# Patient Record
(demographics unavailable — no encounter records)

---

## 2017-05-20 NOTE — HP
DATE OF ADMISSION:  2017

 

Patient of Dr. TAVARES Mitchell.

 

HISTORY OF PRESENT ILLNESS:  The patient is a very pleasant 62-year-old white female with a past his
tory of multiple medical problems including hypertension, type 2 diabetes, chronic atrial fibrillati
on on anticoagulation with Xarelto, chronic sarcoidosis on home oxygen with pulmonary hypertension a
nd sleep apnea on a CPAP, being followed by Dr. Vallecillo and recurrent episodes of falls with 3 f
alls in the last 10 years with subsequent compression fractures initially of L2 and L5 and now with 
a fall with an acute compression fracture of upper part of L1.  She was seen by and Neurosurgery, fo
und to be neurologically intact and was admitted; however, for severe pain.  The patient states that
 she required morphine for pain control with discharge summary that states she is only on tramadol. 
 She has no previous history of osteoporosis as far she knows, but is on treatment for significantly
 low vitamin D with vitamin D 50,000 units weekly and does have the above-mentioned history of sarco
idosis, but no history of steroid treatment as far she knows.  She has no numbness or tingling in ar
ms and legs and only has pain in her back upon movement where she has been wearing a [employer] Clamshell 
brace and is able to sit up with this is in place.  She has been undergoing therapy with pain medica
tion required.

 

REVIEW OF SYSTEMS:  HEENT:  Negative for any change in vision or hearing, hoarseness or dysphagia.  
She has had no trauma to her head.  No history of bleeding in her head.  Pulmonary:  She has the abo
ve-mentioned history of sarcoidosis, sleep apnea, pulmonary hypertension with chronic home oxygen an
d CPAP, also on treatment with Advair and handheld nebulizers with fair control.  She is usually abl
e to ambulate in her home.  In fact, fell while she was shopping.  Cardiovascular:  She denies any c
hest pain, palpitations, history of myocardial infarction or CVA.  She does have the chronic atrial 
fibrillation on Xarelto with above-mentioned, no bleeding and no history of CVA or congestive heart 
failure.  Gastrointestinal:  She denies nausea, vomiting, diarrhea, constipation, or abdominal pain.
  Genitourinary:  She denies dysuria, hematuria, or nocturia.  Musculoskeletal:  She has the above-m
entioned back pain.  _____ negative.  Neurologic:  She denies localized numbness, weakness in arms o
r extremities.

 

PAST MEDICAL HISTORY:  Only remarkable for the above medical history.

 

PAST SURGICAL HISTORY:  Positive for , hysterectomy, cholecystectomy.

 

FAMILY HISTORY:  Positive for lung cancer.

 

SOCIAL HISTORY:  She is a nonsmoker, does drink rarely.

 

ALLERGIES:  She is allergic to SULFA and NAPROSYN.  She is unable to tolerate CODEINE, but can ramonita
ate other opioids.

 

PHYSICAL EXAMINATION:

GENERAL:  Shows a middle-aged age, obese white female lying in bed in no acute distress except when 
she moves, whose is oriented x3 and cooperative.

VITAL SIGNS:  Showed to have blood pressure 149/66, O2 sats 94% on 2 liters, respirations 20, pulse 
103, afebrile.

HEENT:  Pupils are equal, round, and reactive to light and accommodation.  Sclerae are anicteric.  C
onjunctivae pale.  Oral mucous membranes well hydrated.

NECK:  Supple, no nodes or masses.  JVP is not elevated.

LUNGS:  Clear, only a few crackles, decreased breath sounds in the bases.

CARDIAC:  Shows an irregular irregularly rhythm.  No gallops or murmurs.

ABDOMEN:  Soft, nontender with no masses or organomegaly.

SKIN/EXTREMITIES:  Show severe tenderness to palpation in the upper back.  No bruising or bleeding.

NEUROLOGICAL:  Cranial nerves are intact.  Deep tendon reflexes 2+ and equal.  Absent Babinski.

 

LABORATORY AND DIAGNOSTIC DATA:  Most recently shows sodium 135, potassium 4.0, chloride 97, bicarbo
radha 30, BUN 9, creatinine 0.8, glucose 112, hemoglobin 10, bilirubin 1.1, alkaline phosphatase 177,
 AST normal, troponin normal.  PO2 is 74, pCO2 of 54 on 32%.  Chest x-ray showed chronic pulmonary c
ongestion, cardiomegaly and pleural effusion.  CT scan showed the above-mentioned L1 fracture.

 

PLAN:

1.  Restart discharge medications of Advair and handheld nebulizers for sarcoidosis and pulmonary hy
pertension as well as continue CPAP.

2.  Continue lisinopril for control of blood pressure, monitor daily.

3.  Continue carvedilol twice daily as well as rivaroxaban for rate control and anticoagulation of a
trial fibrillation.

4.  Continue Onglyza 5 mg daily for diabetes and diabetic diet and we will do Accu-Cheks q.a.c.

5.  Continue vitamin D 50,000 units every 7 days and calcium citrate and we will discuss bone densit
y with phosphorus with Dr. Mitchell.

6.  Continue on tramadol as needed for mild to moderate pain, morphine as needed for severe pain and
 started on low dose fentanyl patch 12.5 mcg every 72 hours to see if can decrease p.r.n. need for m
edication.  We will start on PT, OT as soon as possible.

## 2017-05-21 NOTE — PRG
DATE OF SERVICE:  05/21/2017

 

SUBJECTIVE:  The patient complains of severe pain in the back despite a clamshell TLSO and oral tram
adol and low dose fentanyl patch and required IV morphine last night.  At rest, no pain, but unable 
to sit without severe pain.

 

OBJECTIVE:

VITAL SIGNS:  Shows her blood pressure is 136/65, O2 sats 94%, respirations 20, pulse 89.  LUNGS:  C
lear.

CARDIAC:  Examination shows regular rhythm.  No gallops or murmurs.

BACK:  Shows severe tenderness to palpation of upper thoracic spine with perivertebral muscle spasms
.

NEUROLOGICAL:  Intact.

 

ASSESSMENT:

1.  Severe pain secondary to T1 compression fracture.

2.  Stable chronic obstructive pulmonary disease, sarcoidosis.

3.  Stable diabetes.

4.  Stable atrial fibrillation with rate control.

 

PLAN:  Increase fentanyl patch to 50 mcg every 72 hours.  Continue tramadol and p.r.n. morphine.  Co
ntinue TLSO brace.

## 2017-05-22 NOTE — PRG
DATE OF SERVICE:  05/22/2017

 

DATE OF ADMISSION:  05/20/2017

 

Ms. Smith is a very pleasant 62-year-old white female that was at [facility] when she fell.  She was b
rought to the hospital and had an L1 acute compression fracture.  She is seen by Neurology and found
 to be neurologically intact and was admitted for severe pain control.  She continued to have signif
icant pain and was transferred to [facility] for pain control and physical therapy and occupational
 therapy.  She presently is in a [facility], but she continues to have pain.

 

Over the weekend, Dr. Armstrong had increased her fentanyl from 25 mcg to 50 mcg patch and she states
 that did help quite a bit.

 

SUBJECTIVE:  The patient complains of continued pain, but much more bearable and intolerable.  She h
as no other complaints at this time.

 

PHYSICAL EXAMINATION:

VITAL SIGNS:  This morning reveal blood pressure 174/71, pulse 89-99, respirations 18-20, O2 sat 95%
-96% on 2 liters, temperature is 97.0.

GENERAL:  This is a well-developed, well-nourished, very pleasant, slightly obese white female in no
 apparent distress at this time.

HEENT:  Reveals normocephalic, nontraumatic cranium.  The pupils are equally round and reactive.  Ex
traocular movements intact.  Nose and throat are slightly dry.

NECK:  Supple, without masses, nodes or bruits.

CHEST:  Clear to auscultation.  No rales, no rhonchi are heard.  Expiratory wheeze heard once on the
 left side.

HEART:  Reveals irregularly irregular rate and rhythm without murmurs, gallops or rubs.  It is rate 
controlled.

ABDOMEN:  Obese, soft, nontender, without organomegaly.  Normal bowel sounds are noted.

:  Exam is deferred.

BACK:  The patient is presently in a [facility].

NEUROLOGIC:  Neurologically intact.

 

LABORATORY DATA:  On admission reveals white count is 4000, hemoglobin 10.0, hematocrit 32.1, and pl
atelet count is 76,000.  Sodium is 134, potassium 5.4, chloride 100, carbon dioxide 21 with a BUN of
 9, creatinine 0.66.  Sugar this morning was 128.

 

IMPRESSION:

1.  T1 compression fracture.

2.  Chronic obstructive pulmonary disease.

3.  Diabetes, stable.

4.  Atrial fibrillation.

5.  Sarcoidosis followed by Dr. Moyer.

6.  Anticoagulation with Xarelto.

7.  Pulmonary hypertension.

8.  Sleep apnea with BiPAP, followed by Dr. Dooley.

9.  Most likely osteoporosis.

 

PLAN:

1.  Continue to follow the patient's blood pressure closely.

2.  Continue to follow the patient's blood sugars with Accu-Cheks a.c. and at bedtime.

3.  Continue present medications.

4.  Continue anticoagulation.

5.  Continue carvedilol for rate control.

6.  Follow the patient and make sure she does not go into RVR.

7.  Continue vitamin D 500,000 units every 7 days.

8.  Continue pain control.

9.  Continue physical therapy and occupational therapy.

10.  Continue DVT and stress ulcer prophylaxis.

11.  Continue decubitus precautions.

## 2017-05-23 NOTE — PRG
DATE OF SERVICE:  05/23/2017

 

HISTORY OF PRESENT ILLNESS:  The patient is a 62-year-old white female that was at [facility] when she 
fell and had acute compression fracture.  She was seen by Neurology and followed neurologically and 
she was intact, therefore, she was transferred here for physical therapy, occupational therapy, and 
pain relief.

 

The patient states she does much better when she is in bed.  TLSO is very tender.

 

She has also not gotten her Miacalcin yet.

 

Her pain control is much better with 50 mg of fentanyl patch.

 

SUBJECTIVE:  The patient has no complaints today.

 

LABORATORY DATA:  Today, reveals the patient had a urinary tract infection and started her on Cipro.

 

PHYSICAL EXAMINATION:

GENERAL:  This is a well-developed, well-nourished, slightly obese white female in no apparent distr
ess at this time.

VITAL SIGNS:  Today reveal blood pressure is 130/61, pulse 75-92, respirations 18, O2 sat 95-99% on 
2 liters 

HEENT:  Reveals normocephalic, nontraumatic cranium.  Pupils are equally round and reactive.  Extrao
cular movements intact.  Nose and throat are slightly dry.

NECK:  Supple, without masses, nodes or bruits.

LUNGS:  Chest is clear to auscultation.  No rales, rhonchi or wheezes are heard today.  No decreased
 breath sounds are heard.

CARDIOVASCULAR:  Heart reveals an irregularly irregular rate and rhythm without murmurs, gallops or 
rubs.  Rate controlled.

ABDOMEN:  Obese, soft, nontender, without organomegaly.  Normal bowel sounds are noted.  No rebound 
or guarding is noted.

GENITOURINARY:  Deferred.

EXTREMITIES:  Reveal no clubbing, cyanosis or edema.  The patient is presently in bed without her TL
SO clamshell.

NEUROLOGIC:  She still intact.

 

IMPRESSION:

1.  Compression fracture of T1.

2.  Chronic obstructive pulmonary disease.

3.  Urinary tract infection, presently on Cipro.

4.  Diabetes, stable.

5.  Atrial fibrillation.

6.  Sarcoidosis, followed by Dr. Moyer.

7.  Anticoagulated with Xarelto.

8.  Pulmonary hypertension.

9.  Sleep apnea with BiPAP, followed by Dr. Dooley.

10.  Osteoporosis.

 

PLAN:

1.  Continue Cipro 500 mg b.i.d. for 10 days.

2.  Continue to follow patient's Accu-Cheks a.c. and at bedtime.

3.  Follow up patient's blood pressures closely.

4.  Continued present medications.

5.  Cipro 500 mg twice a day for 10 days.

6.  Continue carvedilol for rate control.

7.  Followup the patient's rate to make sure she does not go back into RVR.

8.  Continue vitamin D 500,000 units every 7 days.

9.  Continue pain control with fentanyl.

10.  Continue physical therapy and occupational therapy.

11.  Continue deep venous thrombosis and stress ulcer prophylaxis.

12.  Continue decubitus precautions.

## 2017-05-24 NOTE — PRG
DATE OF SERVICE:  05/24/2017

 

DATE OF ADMISSION:  05/20/2017

 

HISTORY OF PRESENT ILLNESS:  Ms. Smith is a very pleasant 62-year-old white female that was at [employer] and fell.  She had an acute compression fracture.  She was seen by Neurology and thought to be n
onoperable.  She was transferred here for physical therapy, occupational therapy, and pain control.

 

The patient states she is doing somewhat better.  She still has some significant pain, especially wh
en she gets up and gets down.  When she is in bed, it does not bother her at all.  She was able to w
alk much better today.  She has started her Miacalcin and she is on a fentanyl patch.

 

SUBJECTIVE:  The patient has no complaints today except for being little tired.

 

PHYSICAL EXAMINATION:

GENERAL:  This is a well-developed, well-nourished, very pleasant, slightly obese white female in no
 apparent distress at this time.

VITAL SIGNS:  Revealed blood pressure this morning 112/58, pulse 87, respirations 20, O2 sat 93% on 
2 liters, temperature max is 97.2.

GENERAL:  This is a well-developed, well-nourished, slightly obese white female in no apparent distr
ess at this time.

HEENT:  Reveals normocephalic, nontraumatic cranium.  Pupils are equal, round, and reactive.  Extrao
cular movements intact.  Nose and throat are slightly dry.

NECK:  Supple, without masses, nodes or bruits.

CHEST:  Clear to auscultation.

HEART:  Reveals irregularly irregular rate and rhythm without murmurs, gallops or rubs, rate control
led.

ABDOMEN:  Obese, soft, nontender, without organomegaly.  Normal bowel sounds are noted.  No rebound 
or guarding is noted.

:  Exam is deferred.

EXTREMITIES:  Reveal no clubbing, cyanosis or edema.  Patient is basically still in bed.  This after
noon taken a nap without her TLSO Clamshell.

NEUROLOGIC:  She is still intact.

 

IMPRESSION:

1.  Compression fracture of L1.

2.  Chronic obstructive pulmonary disease.

3.  Urinary tract infection, presently on Cipro.

4.  Diabetes, stable.

5.  Atrial fibrillation.

6.  Sarcoidosis, followed by Dr. Moyer.

7.  Anticoagulated with Xarelto.

8.  Pulmonary hypertension.

9.  Sleep apnea with BiPAP, followed by Dr. Harrykissoon.

10.  Osteoporosis.

 

PLAN:

1.  Continue Cipro 500 mg for a full 10 days.

2.  Continue to follow the patient Accu-Cheks a.c. and at bedtime.

3.  Follow up on the patient's blood pressure closely.

4.  Continue present meds.

5.  Continue carvedilol for rate control.

6.  Continue vitamin D 500,000 units every 7 days.

7.  Continue pain control with fentanyl 50.

8.  Continue physical therapy and occupational therapy.

9.  Continue DVT and stress ulcer prophylaxis.

10.  Continue decubitus precautions.

## 2017-05-25 NOTE — PRG
DATE OF SERVICE:  05/25/2017

 

HISTORY OF PRESENT ILLNESS:  Ms. Smith is a very pleasant 62-year-old white female that fell at [employer].  She had acute compression fracture at L1.  She was seen by Neurology and found to be nonopera
ble.  She was transferred here for physical therapy, occupational therapy, and pain control.

 

The patient states she does well in bed or standing up, but has significant difficulty and pain when
 she sits and also has difficulty getting in and out of her TLSO clamshell brace.  She has started o
n Miacalcin and is still in fentanyl patch.

 

SUBJECTIVE:  The patient states she is somewhat tired.  She walked to 300 feet with a rest today, di
d well, but again has increased pain when she sits.

 

PHYSICAL EXAMINATION:

VITAL SIGNS:  Blood pressure was 137/73, pulse 85-89, respirations 18-20, O2 sat 94%-95% on 2 liters
, temperature max 98.2.

GENERAL:  This is a well-developed, well-nourished, slightly obese white female in no apparent distr
ess at this time.

HEENT:  Reveals normocephalic, nontraumatic cranium.  Pupils are equally round and reactive.  Nose a
nd throat are slightly dry.  Nasal cannula at 2 liters is going.

NECK:  Supple, without masses, nodes or bruits.

CHEST:  Clear to auscultation, but distant.

HEART:  Reveals irregularly irregular rate and rhythm without murmurs, gallops or rubs.

ABDOMEN:  Obese, soft, nontender, without organomegaly.  Normal bowel sounds are noted.  No rebound 
or guarding is noted.  Clamshell TLSO brace is on.

:  Deferred

EXTREMITIES:  Reveal no clubbing, cyanosis or edema.  The patient is basically in bed with a TLSO cl
amshell that she has put on everytime when she gets up.

NEUROLOGIC:  She is still intact.

 

IMPRESSION:

1.  Compression fracture L1.

2.  Chronic obstructive pulmonary disease.

3.  Urinary tract infection, presently on Cipro.

4.  Diabetes, stable.

5.  Atrial fibrillation.

6.  Sarcoidosis followed by Dr. Moyer.

7.  Anticoagulation with Xarelto.

8.  Pulmonary hypertension.

9.  Sleep apnea with BiPAP, followed by Dr. Dooley.

10.  Osteoporosis.

 

PLAN:

1.  Continue Cipro 500 mg for a full 10 days twice a day.

2.  Continue to follow the patient Accu-Cheks a.c. and at bedtime.

3.  Follow up on the patient's blood pressure closely.

4.  Continue present meds.

5.  Continue carvedilol for rate control.

6.  Continued vitamin D 500,000 units every 7 days.

7.  Continue pain control with fentanyl 50 mcg.

8.  Continue physical therapy and occupational therapy.

9.  Continue deep venous thrombosis and stress ulcer prophylaxis.

10.  Continue decubitus precautions.

## 2017-05-27 NOTE — PRG
DATE OF SERVICE:  05/26/2017

 

DATE OF ADMISSION:  05/20/2017

 

HISTORY OF PRESENT ILLNESS:  Ms. Smith is a very pleasant 62-year-old white female has fell in the 
[employer] parking lot.  She had an acute compression fracture of L1.  She was seen by Neurology in the
 hospital and found to be not operable patient.  She was transferred here for physical therapy, occu
pational therapy, and pain control.

 

Patient states she is getting better about sitting for a while without as much pain.  She still has 
difficulty getting in and out of her TLSO brace.  She was started on Miacalcin on her fentanyl patch
.

 

SUBJECTIVE:  The patient states she is tired, but she walks quite a bit today.  She has no complaint
s.

 

PHYSICAL EXAMINATION:

VITAL SIGNS:  Revealed blood pressure is 127/66, pulse , respirations 20, O2 sat 95% on 2 lite
rs, temperature max 97.6.

GENERAL:  This is a well-developed, well-nourished, slightly obese white female, in no apparent dist
ress at this time.

HEENT:  Reveals normocephalic, nontraumatic cranium.  Pupils equal, round, and reactive.  Extraocula
r movements intact.  Nose and throat are slightly dry.

NECK:  Supple, without masses, nodes, or bruits.

CHEST:  Clear to auscultation, but distant.

HEART:  Irregularly irregular rate and rhythm without murmurs, gallops, or rubs.

ABDOMEN:  Obese, soft, nontender, without organomegaly.  Normal bowel sounds are noted.  No rebound 
or guarding is noted.  Clamshell TLSO brace is off at this time.  The patient admitted and ready for
 bedtime.

GENITOURINARY:  Deferred.

EXTREMITIES:  Reveal no clubbing, cyanosis, or edema.  The patient has no focal deficits.

NEUROLOGIC:  She still intact.

 

IMPRESSION:

1.  Compression fracture L1.

2.  Chronic obstructive pulmonary disease.

3.  Sarcoidosis.

4.  Urinary tract infection, on Cipro.

5.  Diabetes, stable.

6.  Atrial fibrillation.

7.  Sarcoidosis followed by Dr. Moyer.

8.  Anticoagulated with Xarelto.

9.  Pulmonary hypertension.

10.  Sleep apnea with BiPAP, followed by Dr. Dooley.

11.  Osteoporosis.

 

PLAN:

1.  Continue Cipro 500 mg for a full 10 days b.i.d.

2.  Continue to follow the patient Accu-Cheks a.c. and at bedtime.

3.  Follow the patient's blood pressure closely.

4.  Continue present meds.

5.  Continue carvedilol for rate control.

6.  Continue pain control for now, but may need to decrease that.

7.  Continue vitamin D 500,000 units every 7 days.

8.  Continue DVT and stress ulcer prophylaxis.

9.  Continue decubitus precautions.

## 2017-05-27 NOTE — PRG
DATE OF SERVICE:  05/27/2017

 

SUBJECTIVE:  Ms. Smith is a very pleasant 62-year-old white female who fell in [employer] parking lot 
and had acute fracture of L1.  She was seen by Neurology and found to be nonoperable patient.  She w
as transferred to [facility] for physical therapy, occupational therapy, and pain co
ntrol.

 

The patient had the day off today from physical therapy, but did get some walking done.

 

She states she is tired, but she is feeling much better.

 

SUBJECTIVE:

VITAL SIGNS:  Blood pressure is 128/60, pulse 88-89, respirations 20, O2 saturation 94% on 2.5 liter
s, temperature is 97.8.

GENERAL:  This is a well-developed, well-nourished, very pleasant, slightly obese white female in no
 apparent distress at this time.

HEENT:  Reveals normocephalic, nontraumatic cranium.  Pupils are equally round and react to light an
d accommodation.  Extraocular movements intact.  Nose and throat are slightly dry.

NECK:  Supple, without mass, nodes or bruits.

CHEST:  Clear to auscultation.

HEART:  Irregularly irregular rate and rhythm without murmurs, gallops or rubs.

ABDOMEN:  Obese, soft, nontender, without organomegaly.  Normal bowel sounds are noted.

:  Deferred.

EXTREMITIES:  Reveal no clubbing, cyanosis or edema.  The patient continues without any focal defici
ts.

NEUROLOGIC:  She still intact.

 

IMPRESSION:

1.  L1 compression fracture.

2.  Chronic obstructive pulmonary disease.

3.  Sarcoidosis.

4.  Urinary tract infection, on Cipro.

5.  Diabetes.

6.  Atrial fibrillation.

7.  Anticoagulated with Xarelto.

8.  Pulmonary hypertension.

9.  Sleep apnea with BiPAP.

10.  Osteoporosis.

 

PLAN:

1.  Continue Cipro 500 mg for a full 10 days b.i.d.

2.  Follow Accu-Cheks at a.c. and at bedtime.

3.  Follow blood pressure closely.

4.  Continue present meds.

5.  Continue carvedilol for rate control.

6.  Continue pain control with fentanyl patch 50 mcg.

7.  Continue vitamin D 5000 units every 7 days.

8.  Continue DVT and stress ulcer prophylaxis.

9.  Continue physical therapy and occupational therapy.

10.  Continue decubitus precautions.

## 2017-05-29 NOTE — PRG
DATE OF SERVICE:  05/28/2017

 

HISTORY OF PRESENT ILLNESS:  The patient is a very pleasant 62-year-old white female that fell on [place]
[facility] parking lot with resultant L1 compression fracture.  She was initially seen by [facility] and transferred to [facility] for physical therapy, occupational therapy, and 
pain management.

 

The patient states she has had a good long day today.  She started having some pain after she sat up
 too long late this evening and with supper.  She now is back in bed and states she is feeling much 
better.

 

She has no other complaints at this time.

 

PHYSICAL EXAMINATION:

GENERAL:  This is a well-developed, well-nourished, very pleasant, slightly obese white female in no
 apparent distress at this time.

VITAL SIGNS:  149/69, pulse , respirations 20-21, O2 sat on 2 to 2-1/2 liters is .

HEENT:  Reveals normocephalic, nontraumatic cranium.  Pupils are equally round and reactive to light
.  Extraocular movements were intact.  Nose and throat are dry.

NECK:  Supple, without masses, nodes or bruits.

CHEST:  Clear to auscultation.

HEART:  Irregularly irregular rate and rhythm without murmurs, gallops or rubs.

ABDOMEN:  Obese, soft, nontender, without organomegaly.  Normal bowel sounds are noted.  No rebound 
or guarding is noted.

:  Deferred.

EXTREMITIES:  Reveal no clubbing, cyanosis or edema.

NEUROLOGIC:  Patient has no focal deficits.  Neurologically, still intact.

 

IMPRESSION:

1.  L1 compression fracture.

2.  Chronic obstructive pulmonary disease.

3.  Sarcoidosis.

4.  Urinary tract infection, on Cipro 500 mg b.i.d.

5.  Diabetes, stable.

6.  Atrial fibrillation.

7.  Anticoagulated with Xarelto.

8.  Pulmonary hypertension.

9.  Sleep apnea with BiPAP, which she uses every night.

10.  Osteoporosis.

 

PLAN:

1.  Continue Cipro 500 mg twice a day for a full 10 days.

2.  Follow Accu-Cheks a.c. and at bedtime.

3.  We will follow blood pressure closely.

4.  Continue carvedilol for rate control.

5.  Fentanyl patch as able and start weaning as soon as possible.

6.  Continue DVT and stress ulcer prophylaxis.

7.  Continue physical therapy and occupational therapy.

8.  Continue decubitus precautions.

## 2017-05-29 NOTE — PRG
DATE OF SERVICE:  05/29/2017

 

HISTORY OF PRESENT ILLNESS:  Mrs. Smith is a very pleasant 62-year-old white female who fell in the
 [employer] parking lot.  She had resultant L1 compression fracture.  She initially was seen by Amor neely at [facility].  It was determined that she was not an operative candidate and therefore 
she was transferred to [facility] for pain management, physical therapy and occupati
onal therapy.

 

Patient is doing better.  She continues to have quite a bit of pain, especially when she sits.  She 
does not get out of bed without her back brace on at all.  She is getting her family to learn help w
ith a back brace on and help her.  Unfortunately, her  is disabled and has very limited capac
ity.  Her daughter does not live with her and is actually long distance away.

 

The patient would like to be off her fentanyl patch so she can try to go home without that.  Also, cary becerra is requesting 1-2 Tylenol p.r.n.  She is also requesting if we can contact Dr. Rodriguez's off
ice and find out if she can take a shower without her back brace on.

 

PHYSICAL EXAMINATION:

VITAL SIGNS:  Today reveal blood pressure this morning 156/76, pulse 60, respirations 17, O2 sat 95%
 on 2 liters, temperature 98.0.

GENERAL:  This is a well-developed, well-nourished, very pleasant white female in no apparent distre
ss at this time.

HEENT:  Reveals normocephalic, nontraumatic cranium.  Pupils are equally round and reactive.  Extrao
cular movements are intact.  Nose and throat are slightly dry.

NECK:  Supple, without masses, nodes or bruits.

LUNGS:  Chest is clear to auscultation.  She is wearing oxygen pretty much all the time.

HEART:  Reveals a regular rate and rhythm without murmurs, gallops or rubs.

ABDOMEN:  Obese, soft, nontender, without organomegaly.  Normal bowel sounds are noted.  No rebound 
or guarding is noted.

:  Deferred.

EXTREMITIES:  Reveal no clubbing, cyanosis or edema.

NEUROLOGIC:  Patient has no focal deficits.  She is neurologically intact.

SKIN:  Reveals an unusual puritic type rash tends to come and go in waves.

 

IMPRESSION:

1.  L1 compression fracture.

2.  Compression fracture pain.

3.  Chronic obstructive pulmonary disease.

4.  Sarcoidosis.

5.  Urinary tract infection, on Cipro b.i.d.

6.  Diabetes, which is stable.

7.  Atrial fibrillation.

8.  Anticoagulated with Xarelto.

9.  Pulmonary hypertension.

10.  Sleep apnea, wears BiPAP, which she uses every night.

11.  Osteoporosis.

12.  Rash.

 

PLAN:

1.  Continue Cipro twice a day for 10 days.

2.  Follow the Accu-Cheks a.c. and at bedtime.

3.  Follow blood pressure closely.

4.  Continue carvedilol for rate control.

5.  We will stop her fentanyl patch tonight.

6.  We will add Tylenol 325 one to two p.r.n.

7.  Continue tramadol as needed.

8.  Deep venous thrombosis and stress ulcer prophylaxis.

9.  Continue physical therapy and occupational therapy.

10.  Continue decubitus precautions.

11.  Teach the  and daughter how to bind and unbind her back brace.

## 2017-05-30 NOTE — PRG
DATE OF SERVICE:  05/30/2017

 

HISTORY OF PRESENT ILLNESS:  The patient is a very pleasant 62-year-old white female that fell in [facility] parking lot with a resultant L1 compression fracture.  She was seen by Neurosurgery at [facility] and found not need surgery.  She was transferred to [facility] for 
pain management, physical therapy and occupational therapy.

 

She has been doing actually very well.  We stopped her fentanyl patch yesterday.  She has not had si
gnificant pain that she needed any significant narcotics for this morning yet.

 

VITAL SIGNS:  Blood pressure this morning was slightly elevated at 162/72, pulse 77 to 90, respirati
ons 18-20, O2 sat 93-98%.  Temperature 97.7.

 

PHYSICAL EXAMINATION:

GENERAL:  This is a well-developed, well-nourished, very pleasant white female in no apparent distre
ss at this time.

HEENT:  Reveals normocephalic, nontraumatic cranium.  Pupils are equally round and reactive.  Extrao
cular movements intact.  Nose and throat are slightly dry.

NECK:  Supple, without masses, nodes or bruits.

LUNGS:  Chest is clear to auscultation, distant breath sounds are noted.  The patient wears her oxyg
en all the time.

CARDIOVASCULAR:  Heart reveals an irregularly irregular rate and rhythm without murmurs, gallops or 
rubs.

ABDOMEN:  Obese, soft, nontender, without organomegaly.  Normal bowel sounds are noted.  No rebound 
or guarding is noted.

:  Deferred.

EXTREMITIES:  Reveal no clubbing, cyanosis or edema.

NEUROLOGIC:  The patient still has no focal deficits.  She is neurologically intact.

 

IMPRESSION:

1.  L1 compression fracture.

2.  Compression fracture pain.

3.  Chronic obstructive pulmonary disease.

4.  Sarcoidosis.

5.  Urinary tract infection, on Cipro b.i.d.

6.  Diabetes, which is stable.

7.  Atrial fibrillation.

8.  Anticoagulated with Xarelto.

9.  Pulmonary hypertension.  

10.  The patient wears BiPAP at night for her sleep apnea.

11.  Osteoporosis.  

12.  Rash.

 

PLAN:

1.  Continue Cipro twice a day for a total of 10 days.

2.  Follow Accu-Cheks a.c. and at bedtime.

3.  Follow blood pressure closely . 

4.  Continue carvedilol for rate control.

5.  The patient is off her fentanyl, doing well.

6.  Tylenol 1-2 q.4 h. p.r.n. to a max of 3000 a day.

7.  DVT and stress ulcer prophylaxis.

8.  Continue physical therapy and occupational therapy.

9.  Continued decubitus precautions.

10.  Teach the  and daughter how to bind her neck brace.

11.  Anticipate discharge tomorrow afternoon.

## 2017-05-31 NOTE — DIS
DATE OF ADMISSION:  05/20/2017

 

DATE OF DISCHARGE:  05/31/2017

 

HOSPITAL COURSE:  The patient is a 62-year-old white female who apparently fell 
on the [employer] parking lot.  She sustained a L1 compression fracture.  She was 
seen by Neurosurgery at [facility] and determined not to have any 
surgery.  She was transferred to [facility] for pain management
, physical therapy and occupational therapy.

 

The patient is much improved.  She is able to sit for a short period of time, 
walk, but spends most of her time in bed because of her pain.  She is much 
improved and is able to get enough help at home to put her brace on and off, so 
she can get up.  She has no complaints and is ready for discharge today.

 

PHYSICAL EXAMINAITON:

VITAL SIGNS:  Today reveal blood pressure 150/80, pulse 82-86, respirations 20, 
O2 sat 95%-96% on 2-1/2 liters.

GENERAL:  This is a well-developed, well-nourished, very pleasant white female 
in no apparent distress at this time.

HEENT:  Reveals normocephalic, nontraumatic cranium.  Pupils equal, round, and 
reactive.  Extraocular movements intact.  Nose and throat are slightly dry.

NECK:  Supple, without masses, nodes or bruits.  The chest reveals occasional 
wheeze.  The patient states she has been wheezing a little bit off and on in 
the last couple of days.  She has not had her inhaler, so we will give her 
DuoNeb breathing treatment here now q.4 hours p.r.n.  HEART:  Reveals an 
irregularly irregular rate and rhythm without murmurs, gallops or rubs.

ABDOMEN:  Obese, soft, nontender, without organomegaly.  Normal bowel sounds 
are noted.  No rebound or guarding is noted.

GENITOURINARY:  Deferred.

EXTREMITIES:  Reveal no clubbing, cyanosis or edema.

NEUROLOGIC:  The patient has no focal deficits.  She is neurologically intact.

 

The patient is to wear TLSO brace at all times even though she is in bed.  She 
did get cleared by Neurosurgery to go take a shower, but she would have to be 
sitting down in the shower, then take her brace off, the shower, then dry, then 
put her brace on and then get back up out of the shower.

 

IMPRESSION:

1.  L1 compression fracture.

2.  Compression fracture pain, well managed.

3.  Chronic obstructive pulmonary disease.

4.  Sarcoidosis.

5.  Urinary tract infections, finishes her Cipro.

6.  Diabetes, which is stable.

7.  Atrial fibrillation.

8.  Anticoagulation with Xarelto.

9.  Pulmonary hypertension.

10.  BiPAP at night for her sleep apnea.

11.  Osteoporosis.

12.  Rash, improving.

 

PLAN:

1.  The patient will not need Cipro when she was at home, she finished her 
total doses.

2.  Follow Accu-Cheks a.c. and at bedtime.

3.  DuoNeb now and q.4 hours p.r.n.

4.  Discharged this evening after 5:30, her daughter to pick her up.

5.  Continue to follow blood pressure closely.

6.  Continue carvedilol for rate control.

7.  The patient is off fentanyl patch and doing well.

8.  Tylenol every 1-4 hours, q.4 hours to max of 3000 mg a day.

9.  Deep venous thrombosis and stress ulcer prophylaxis.

10.  [facility] her [facility] care for continued physical 
therapy.

11.  Continue decubitus precautions.

12.  Discharged this afternoon.

13.  We went over her medications in detail in her room. She does not need any 
written prescriptions. 

 

This discharge summary did take me more than 35 minutes to discharge this 
patient.

 

BARBRA

## 2017-08-14 NOTE — RAD
LEFT WRIST 3 VIEWS:

 

HISTORY: 

Wrist pain.

 

COMPARISON: 

None.

 

FINDINGS: 

There is collapse and chronic fracture of the lunate.  The lunate is within the 2 dominant fragments
.

 

Moderate to severe osteoarthritic disease of the thumb carpometacarpal joint.

 

No acute displaced fracture is appreciated.

 

IMPRESSION: 

1.  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 

2.  Moderate to severe degenerative disease of the thumb carpometacarpal joint.

 

POS: [facility]